# Patient Record
Sex: FEMALE | Race: OTHER
[De-identification: names, ages, dates, MRNs, and addresses within clinical notes are randomized per-mention and may not be internally consistent; named-entity substitution may affect disease eponyms.]

---

## 2019-06-04 ENCOUNTER — HOSPITAL ENCOUNTER (OUTPATIENT)
Dept: HOSPITAL 56 - MW.OBCHECK | Age: 24
Setting detail: OBSERVATION
Discharge: HOME | End: 2019-06-04
Attending: OBSTETRICS & GYNECOLOGY | Admitting: OBSTETRICS & GYNECOLOGY
Payer: SELF-PAY

## 2019-06-04 DIAGNOSIS — Z3A.35: ICD-10-CM

## 2019-06-17 ENCOUNTER — HOSPITAL ENCOUNTER (INPATIENT)
Dept: HOSPITAL 56 - MW.OB | Age: 24
LOS: 2 days | Discharge: HOME | End: 2019-06-19
Attending: OBSTETRICS & GYNECOLOGY | Admitting: OBSTETRICS & GYNECOLOGY
Payer: COMMERCIAL

## 2019-06-17 DIAGNOSIS — Z3A.38: ICD-10-CM

## 2019-06-17 NOTE — PCM.LDHP
L&D History of Present Illness





- General


Date of Service: 06/17/19


Admit Problem/Dx: 


 Patient Status Order with Admit Dx/Problem





06/17/19 14:17


Patient Status [ADT] Routine 








 Admission Diagnosis/Problem











Admission Diagnosis/Problem    Pregnancy














Source of Information: Patient


History Limitations: Reports: No Limitations





- History of Present Illness


Improves with: Reports: None


Worsens with: Reports: None


Associated Symptoms: Reports: N





- Related Data


Allergies/Adverse Reactions: 


 Allergies











Allergy/AdvReac Type Severity Reaction Status Date / Time


 


No Known Allergies Allergy   Verified 06/04/19 16:32











Home Medications: 


 Home Meds





Prenatal #103/Iron Fumarate/Fa [ Prenatal] 1 tab PO DAILY 12/20/18 [

History]











Past Medical History





- Past Health History


Medical/Surgical History: Denies Medical/Surgical History


OB/GYN History: Reports: Pregnancy





- Infectious Disease History


Infectious Disease History: Reports: None





Social & Family History





- Family History


Family Medical History: Noncontributory





- Caffeine Use


Caffeine Use: Reports: Coffee





H&P Review of Systems





- Review of Systems:


Review Of Systems: See Below


General: Reports: No Symptoms


HEENT: Reports: No Symptoms


Pulmonary: Reports: No Symptoms


Cardiovascular: Reports: No Symptoms


Gastrointestinal: Reports: No Symptoms


Genitourinary: Reports: No Symptoms


Musculoskeletal: Reports: No Symptoms


Skin: Reports: No Symptoms


Psychiatric: Reports: No Symptoms


Neurological: Reports: No Symptoms


Hematologic/Lymphatic: Reports: No Symptoms


Immunologic: Reports: No Symptoms





L&D Exam





- Exam


Exam: See Below





- Vital Signs


Weight: 74.389 kg





- OB Specific


Fundal Height In cm: 37


Contraction Intensity: Mild


Fetal Movement: Active


Fetal Heart Tones: Present


Birth Presentation: Vertex





- Owens Score


Owens Score Cervix Position: Midposition


Owens Score Consistency: Soft


Owens Score Effacement: 31-50%


Owens Score Dilation: 1-2 cm


Owens Score Infant's Station: -3


Owens Score Total: 5





- Exam


General: Alert, Oriented


HEENT: PERRLA, Conjunctiva Clear, EACs Clear, EOMI, Hearing Intact, Mucosa 

Moist & Pink, Nares Patent, Normal Nasal Septum, Posterior Pharynx Clear, TMs 

Clear


Neck: Supple, Trachea Midline


Lungs: Clear to Auscultation, Normal Respiratory Effort


Cardiovascular: Regular Rate, Regular Rhythm


GI/Abdominal Exam: Normal Bowel Sounds, Soft, Non-Tender, No Organomegaly, No 

Distention, No Abnormal Bruit, No Mass, Pelvis Stable


Rectal Exam: Normal Exam, Normal Rectal Tone


Genitourinary: Normal external exam, Normal bimanual exam, Normal speculum exam


Back Exam: Normal Inspection, Full Range of Motion


Extremities: Normal Inspection, Normal Range of Motion, Non-Tender, No Pedal 

Edema, Normal Capillary Refill


Skin: Warm, Dry, Intact


Neurological: Cranial Nerves Intact, Reflexes Equal Bilateral


Psychiatric: Alert, Normal Affect, Normal Mood





- Patient Data


Lab Results Last 24 hrs: 


 Laboratory Results - last 24 hr











  06/17/19 06/17/19 Range/Units





  14:45 14:45 


 


WBC  4.85   (4.0-11.0)  K/uL


 


RBC  4.28 L   (4.30-5.90)  M/uL


 


Hgb  12.7   (12.0-16.0)  g/dL


 


Hct  38.6   (36.0-46.0)  %


 


MCV  90.2   (80.0-98.0)  fL


 


MCH  29.7   (27.0-32.0)  pg


 


MCHC  32.9   (31.0-37.0)  g/dL


 


RDW Std Deviation  48.5   (28.0-62.0)  fl


 


RDW Coeff of Leann  15   (11.0-15.0)  %


 


Plt Count  94 L   (150-400)  K/uL


 


MPV  10.60   (7.40-12.00)  fL


 


Nucleated RBC %  0.0   /100WBC


 


Nucleated RBCs #  0   K/uL


 


Blood Type   O POSITIVE  


 


Antibody Screen   NEGATIVE  











Result Diagrams: 


 06/17/19 14:45





Problem List Initiated/Reviewed/Updated: Yes


Orders Last 24hrs: 


 Active Orders 24 hr











 Category Date Time Status


 


 Patient Status [ADT] Routine ADT  06/17/19 14:17 Active


 


 Bedrest Bathroom Privileges [RC] ASDIRECTED Care  06/17/19 14:17 Active


 


 Blood Glucose Check, Bedside [RC] Q4HR Care  06/17/19 15:23 Active


 


 Communication Order [RC] ASDIRECTED Care  06/17/19 14:17 Active


 


 Communication Order [RC] ASDIRECTED Care  06/17/19 14:17 Active


 


 Communication Order [RC] ASDIRECTED Care  06/17/19 14:17 Active


 


 Fetal Heart Tones [RC] CONTINUOUS Care  06/17/19 14:17 Active


 


 May Shower [RC] ASDIRECTED Care  06/17/19 14:17 Active


 


 Notify Provider [RC] PRN Care  06/17/19 14:17 Active


 


 Notify Provider [RC] PRN Care  06/17/19 14:17 Active


 


 Notify Provider [RC] PRN Care  06/17/19 14:17 Active


 


 Notify Provider [RC] STAT Care  06/17/19 14:17 Active


 


 Oxygen Therapy [RC] ASDIRECTED Care  06/17/19 14:17 Active


 


 Vaginal Exam [RC] PRN Care  06/17/19 14:17 Active


 


 Vaginal Exam [RC] PRN Care  06/17/19 14:17 Active


 


 Vital Signs [RC] PER UNIT ROUTINE Care  06/17/19 14:17 Active


 


 Consistent Carbohydrate Diet [DIET] Diet  06/17/19 Dinner Active


 


 Butorphanol [Stadol] Med  06/17/19 14:17 Active





 1 mg IVPUSH Q1H PRN   


 


 Carboprost Tromethamine [Hemabate DS] Med  06/17/19 14:17 Active





 250 mcg IM ASDIRECTED PRN   


 


 Lactated Ringers [Ringers, Lactated] 1,000 ml Med  06/17/19 14:30 Active





 IV ASDIRECTED   


 


 Lidocaine 1% [Xylocaine 1%] Med  06/17/19 14:17 Active





 50 ml INJECT ONETIME PRN   


 


 Methylergonovine [Methergine] Med  06/17/19 14:17 Active





 0.2 mg IM ASDIRECTED PRN   


 


 Nalbuphine [Nubain] Med  06/17/19 14:17 Active





 10 mg IVPUSH Q1H PRN   


 


 Ondansetron [Zofran] Med  06/17/19 14:17 Active





 4 mg IV Q6H PRN   


 


 Oxytocin/0.9 % Sodium Chloride [Oxytocin 30 Unit/500 ML Med  06/17/19 14:30 

Active





 -NS]   





 30 unit in 500 ml IV TITRATE   


 


 Oxytocin/0.9 % Sodium Chloride [Oxytocin 30 Unit/500 ML Med  06/17/19 14:30 

Active





 -NS]   





 30 unit in 500 ml IV TITRATE   


 


 Sodium Chloride 0.9% [Normal Saline] Med  06/17/19 14:17 Active





 10 ml IV ASDIRECTED PRN   


 


 Sodium Chloride 0.9% [Saline Flush] Med  06/17/19 14:17 Active





 10 ml FLUSH ASDIRECTED PRN   


 


 Sodium Chloride 0.9% [Saline Flush] Med  06/17/19 14:17 Active





 2.5 ml FLUSH ASDIRECTED PRN   


 


 Terbutaline [Brethine] Med  06/17/19 14:17 Active





 0.25 mg SUBCUT ASDIRECTED PRN   


 


 Tranexamic Acid [Cyklokapron] 1,000 mg Med  06/17/19 14:17 Active





 Sodium Chloride 0.9% [Normal Saline] 100 ml   





 IV ONETIME   


 


 Water For Irrigation,Sterile [Sterile Water for Med  06/17/19 14:17 Active





 Irrigation]   





 1,000 ml IRR ASDIRECTED PRN   


 


 miSOPROStol [Cytotec] Med  06/17/19 14:17 Active





 200 mcg PO ONETIME PRN   


 


 miSOPROStol [Cytotec] Med  06/17/19 15:00 Active





 25 mcg PO ONETIME PRN   


 


 miSOPROStol [Cytotec] Med  06/17/19 19:00 Active





 25 mcg PO Q4H PRN   


 


 miSOPROStol [Cytotec] Med  06/17/19 15:00 Active





 25 mcg VAG ONETIME PRN   


 


 miSOPROStol [Cytotec] Med  06/17/19 19:00 Active





 25 mcg VAG Q4H PRN   


 


 Fetal Scalp Electrode [WOMSER] Per Unit Routine Oth  06/17/19 14:17 Ordered


 


 Medication Administration Instruction [OM.PC] Q3H Oth  06/17/19 14:30 Ordered


 


 Peripheral IV Insertion Adult [OM.PC] Routine Oth  06/17/19 14:17 Ordered


 


 Resuscitation Status Routine Resus Stat  06/17/19 14:17 Ordered








 Medication Orders





Butorphanol Tartrate (Stadol)  1 mg IVPUSH Q1H PRN


   PRN Reason: Pain


Carboprost Tromethamine (Hemabate Ds)  250 mcg IM ASDIRECTED PRN


   PRN Reason: Post Partum Hemorrhage


Lactated Ringer's (Ringers, Lactated)  1,000 mls @ 150 mls/hr IV ASDIRECTED PATRICIA


Oxytocin/Sodium Chloride (Oxytocin 30 Unit/500 Ml-Ns)  30 unit in 500 mls @ 999 

mls/hr IV TITRATE PATRICIA


Oxytocin/Sodium Chloride (Oxytocin 30 Unit/500 Ml-Ns)  30 unit in 500 mls @ 2 

mls/hr IV TITRATE PATRICIA; Protocol


Tranexamic Acid 1,000 mg/ (Sodium Chloride)  110 mls @ 660 mls/hr IV ONETIME PRN


   PRN Reason: Bleeding


Lidocaine HCl (Xylocaine 1%)  50 ml INJECT ONETIME PRN


   PRN Reason: Laceration repair


Methylergonovine Maleate (Methergine)  0.2 mg IM ASDIRECTED PRN


   PRN Reason: Post Partum Hemorrhage


Misoprostol (Cytotec)  200 mcg PO ONETIME PRN


   PRN Reason: Post Partum Hemorrhage


Misoprostol (Cytotec)  25 mcg VAG ONETIME PRN


   PRN Reason: Cervical Ripening


   Last Admin: 06/17/19 15:59  Dose: 25 mcg


Misoprostol (Cytotec)  25 mcg VAG Q4H PRN


   PRN Reason: Cervical Ripening


Misoprostol (Cytotec)  25 mcg PO ONETIME PRN


   PRN Reason: Cervical Ripening


   Last Admin: 06/17/19 15:53  Dose: 25 mcg


Misoprostol (Cytotec)  25 mcg PO Q4H PRN


   PRN Reason: Cervical Ripening


Nalbuphine HCl (Nubain)  10 mg IVPUSH Q1H PRN


   PRN Reason: Pain (severe 7-10)


Ondansetron HCl (Zofran)  4 mg IV Q6H PRN


   PRN Reason: Nausea/Vomiting


Sodium Chloride (Saline Flush)  10 ml FLUSH ASDIRECTED PRN


   PRN Reason: Keep Vein Open


Sodium Chloride (Saline Flush)  2.5 ml FLUSH ASDIRECTED PRN


   PRN Reason: Keep Vein Open


Sodium Chloride (Normal Saline)  10 ml IV ASDIRECTED PRN


   PRN Reason: IV Use


Sterile Water (Sterile Water For Irrigation)  1,000 ml IRR ASDIRECTED PRN


   PRN Reason: delivery


Terbutaline Sulfate (Brethine)  0.25 mg SUBCUT ASDIRECTED PRN


   PRN Reason: Tacysystole








Assessment/Plan Comment:: 





. diabeticadmoted for elective induction.

## 2019-06-17 NOTE — PCM.PREANE
Preanesthetic Assessment





- Procedure


Proposed Procedure: 





labor epidural





- Anesthesia/Transfusion/Family Hx


Anesthesia History: No Prior Anesthesia


Family History of Anesthesia Reaction: No


Transfusion History: No Prior Transfusion(s)





- Review of Systems


Pulmonary: No Symptoms


Cardiovascular: No Symptoms


Gastrointestinal: No Symptoms


Neurological: No Symptoms


Other: Reports: None





- Physical Assessment


NPO Status Date: 19


NPO Status Time: 16:00


Height: 1.68 m


Weight: 74.389 kg


Mental Status: Alert & Oriented x3


Dentition: Reports: Normal Dentition


ROM/Head Extension: Full





- Lab


Values: 





 Laboratory Last Values











WBC  4.85 K/uL (4.0-11.0)   19  14:45    


 


RBC  4.28 M/uL (4.30-5.90)  L  19  14:45    


 


Hgb  12.7 g/dL (12.0-16.0)   19  14:45    


 


Hct  38.6 % (36.0-46.0)   19  14:45    


 


MCV  90.2 fL (80.0-98.0)   19  14:45    


 


MCH  29.7 pg (27.0-32.0)   19  14:45    


 


MCHC  32.9 g/dL (31.0-37.0)   19  14:45    


 


RDW Std Deviation  48.5 fl (28.0-62.0)   19  14:45    


 


RDW Coeff of Leann  15 % (11.0-15.0)   19  14:45    


 


Plt Count  94 K/uL (150-400)  L  19  14:45    


 


MPV  10.60 fL (7.40-12.00)   19  14:45    


 


Nucleated RBC %  0.0 /100WBC  19  14:45    


 


Nucleated RBCs #  0 K/uL  19  14:45    


 


POC Glucose  114 mg/dL ()  H  19  21:19    


 


Blood Type  O POSITIVE   19  14:45    


 


Antibody Screen  NEGATIVE   19  14:45    














- Allergies


Allergies/Adverse Reactions: 


 Allergies











Allergy/AdvReac Type Severity Reaction Status Date / Time


 


No Known Allergies Allergy   Verified 19 16:32














- Blood


Blood Available: No


Product(s) Available: None





- Anesthesia Plan


Pre-Op Medication Ordered: None





- Acknowledgements


Anesthesia Type Planned: Epidural


Pt an Appropriate Candidate for the Planned Anesthesia: Yes


Alternatives and Risks of Anesthesia Discussed w Pt/Guardian: Yes


Pt/Guardian Understands and Agrees with Anesthesia Plan: Yes





PreAnesthesia Questionnaire


Respiratory History: Reports: None


OB/GYN History: Reports: Pregnancy


: 2


Para: 1


LMP (Approximate): Pregnant


Musculoskeletal History: Reports: None


Neurological History: Reports: None


Psychiatric History: Reports: None


Endocrine/Metabolic History: Reports: Diabetes, Gestational


Hematologic History: Reports: None


Immunologic History: Reports: None


Oncologic (Cancer) History: Reports: None


Dermatologic History: Reports: None





- Past Surgical History


HEENT Surgical History: Reports: None


Cardiovascular Surgical History: Reports: None


Respiratory Surgical History: Reports: None


GI Surgical History: Reports: None


Female  Surgical History: Reports: None


Endocrine Surgical History: Reports: None


Neurological Surgical History: Reports: None


Musculoskeletal Surgical History: Reports: None


Oncologic Surgical History: Reports: None


Dermatological Surgical History: Reports: None





- SUBSTANCE USE


Smoking Status *Q: Never Smoker


Second Hand Smoke Exposure: No


Recreational Drug Use History: No





- HOME MEDS


Home Medications: 


 Home Meds





Prenatal #103/Iron Fumarate/Fa [ Prenatal] 1 tab PO DAILY 18 [

History]











- CURRENT (IN HOUSE) MEDS


Current Meds: 





 Current Medications





Butorphanol Tartrate (Stadol)  1 mg IVPUSH Q1H PRN


   PRN Reason: Pain


Carboprost Tromethamine (Hemabate Ds)  250 mcg IM ASDIRECTED PRN


   PRN Reason: Post Partum Hemorrhage


Lactated Ringer's (Ringers, Lactated)  1,000 mls @ 150 mls/hr IV ASDIRECTED PATRICIA


   Last Admin: 19 21:51 Dose:  150 mls/hr


Oxytocin/Sodium Chloride (Oxytocin 30 Unit/500 Ml-Ns)  30 unit in 500 mls @ 999 

mls/hr IV TITRATE PATRICIA


Oxytocin/Sodium Chloride (Oxytocin 30 Unit/500 Ml-Ns)  30 unit in 500 mls @ 2 

mls/hr IV TITRATE PATRICIA; Protocol


Tranexamic Acid 1,000 mg/ (Sodium Chloride)  110 mls @ 660 mls/hr IV ONETIME PRN


   PRN Reason: Bleeding


Lidocaine HCl (Xylocaine 1%)  50 ml INJECT ONETIME PRN


   PRN Reason: Laceration repair


Methylergonovine Maleate (Methergine)  0.2 mg IM ASDIRECTED PRN


   PRN Reason: Post Partum Hemorrhage


Misoprostol (Cytotec)  200 mcg PO ONETIME PRN


   PRN Reason: Post Partum Hemorrhage


Misoprostol (Cytotec)  25 mcg VAG ONETIME PRN


   PRN Reason: Cervical Ripening


   Last Admin: 19 15:59 Dose:  25 mcg


Misoprostol (Cytotec)  25 mcg VAG Q4H PRN


   PRN Reason: Cervical Ripening


Misoprostol (Cytotec)  25 mcg PO ONETIME PRN


   PRN Reason: Cervical Ripening


   Last Admin: 19 15:53 Dose:  25 mcg


Misoprostol (Cytotec)  25 mcg PO Q4H PRN


   PRN Reason: Cervical Ripening


Nalbuphine HCl (Nubain)  10 mg IVPUSH Q1H PRN


   PRN Reason: Pain (severe 7-10)


Ondansetron HCl (Zofran)  4 mg IV Q6H PRN


   PRN Reason: Nausea/Vomiting


Sodium Chloride (Saline Flush)  10 ml FLUSH ASDIRECTED PRN


   PRN Reason: Keep Vein Open


Sodium Chloride (Saline Flush)  2.5 ml FLUSH ASDIRECTED PRN


   PRN Reason: Keep Vein Open


Sodium Chloride (Normal Saline)  10 ml IV ASDIRECTED PRN


   PRN Reason: IV Use


Sterile Water (Sterile Water For Irrigation)  1,000 ml IRR ASDIRECTED PRN


   PRN Reason: delivery


Terbutaline Sulfate (Brethine)  0.25 mg SUBCUT ASDIRECTED PRN


   PRN Reason: Tacysystole





Discontinued Medications





Fentanyl/Bupivacaine HCl (Fentanyl-Bupiv-Ns 2 Mcg/Ml-0.125%) Confirm 

Administered Dose 100 mls @ as directed .ROUTE .STK-MED ONE


   Stop: 19 20:36

## 2019-06-18 PROCEDURE — 3E0P7VZ INTRODUCTION OF HORMONE INTO FEMALE REPRODUCTIVE, VIA NATURAL OR ARTIFICIAL OPENING: ICD-10-PCS | Performed by: OBSTETRICS & GYNECOLOGY

## 2019-06-18 NOTE — PCM.DEL
L & D Note





- General Info


Date of Service: 19


Mother's Due Date: 19





- Delivery Note


Labor: Augmented by Oxytocin


Cervical Ripening Method: Misoprostil


Delivery Outcome: Livebirth


Infant Delivery Method: Spontaneous Vaginal Delivery-Single


Infant Delivery Mode: Spontaneous


Birth Presentation: Vertex


Nuchal Cord: None


Anesthesia Type: Epidural


Amniotic Fluid Description: Clear


Episiotomy Type: None


Laceration: None


Placenta: Intact, Spontaneous


Cord: 3 Vessels


Estimated Blood Loss: 150


Resuscitation Needed: No


APGAR Score 1 min: 8


APGAR Score 5 min: 9


Second Stage Interventions: Reports: Pushing, Pulls Own Legs Back


Delivery Comments (Free Text/Narrative):: 





 of viable female, Head delivered with good pushing, shoulders and body 

followed with good pushing. Tight abdomen. Infant with spont cry placed on 

mothers abd with RN at .  Delayed cord clamping. Pitocin to IVF. Cord clamped 

and cut by FOB. Cord blood collected. Placenta delivered grossly intact 3VC. 

Inspection noted intact perineum. Mother and baby left in stable condition.





Induction Criteria





- Owens Score


Owens Score Dilation: 1-2 cm


Owens Score Effacement: 60-70%


Owens Score Infant's Station: -2


Owens Score Consistency: Soft


Owens Score Cervix Position: Midposition


Owens Score Total: 7


Owens Score Presenting Part: Reports: Cephalic





- Induction


Gestational Age >/= 39 wks: No


Medical Indication: 





GDMA2


Reassuring Fetal Monitoring Strip: Yes


Absence of Tachy Systole: Yes





- General Info


Date of Service: 19


Admission Dx/Problem (Free Text): 


 Patient Status Order with Admit Dx/Problem





19 14:17


Patient Status [ADT] Routine 








 Admission Diagnosis/Problem











Admission Diagnosis/Problem    Pregnancy














Functional Status: Reports: Pain Controlled





- Review of Systems


General: Reports: No Symptoms


HEENT: Reports: No Symptoms


Pulmonary: Reports: No Symptoms


Cardiovascular: Reports: No Symptoms


Gastrointestinal: Reports: No Symptoms


Genitourinary: Reports: No Symptoms


Musculoskeletal: Reports: No Symptoms


Skin: Reports: No Symptoms


Neurological: Reports: No Symptoms


Psychiatric: Reports: No Symptoms





- Patient Data


Weight - Most Recent: 74.389 kg


Lab Results Last 24 Hours: 


 Laboratory Results - last 24 hr











  19 Range/Units





  14:45 14:45 16:27 


 


WBC  4.85    (4.0-11.0)  K/uL


 


RBC  4.28 L    (4.30-5.90)  M/uL


 


Hgb  12.7    (12.0-16.0)  g/dL


 


Hct  38.6    (36.0-46.0)  %


 


MCV  90.2    (80.0-98.0)  fL


 


MCH  29.7    (27.0-32.0)  pg


 


MCHC  32.9    (31.0-37.0)  g/dL


 


RDW Std Deviation  48.5    (28.0-62.0)  fl


 


RDW Coeff of Leann  15    (11.0-15.0)  %


 


Plt Count  94 L    (150-400)  K/uL


 


MPV  10.60    (7.40-12.00)  fL


 


Nucleated RBC %  0.0    /100WBC


 


Nucleated RBCs #  0    K/uL


 


POC Glucose    82  ()  mg/dL


 


Blood Type   O POSITIVE   


 


Antibody Screen   NEGATIVE   














  19 Range/Units





  20:07 21:19 23:38 


 


WBC     (4.0-11.0)  K/uL


 


RBC     (4.30-5.90)  M/uL


 


Hgb     (12.0-16.0)  g/dL


 


Hct     (36.0-46.0)  %


 


MCV     (80.0-98.0)  fL


 


MCH     (27.0-32.0)  pg


 


MCHC     (31.0-37.0)  g/dL


 


RDW Std Deviation     (28.0-62.0)  fl


 


RDW Coeff of Leann     (11.0-15.0)  %


 


Plt Count     (150-400)  K/uL


 


MPV     (7.40-12.00)  fL


 


Nucleated RBC %     /100WBC


 


Nucleated RBCs #     K/uL


 


POC Glucose  190 H  114 H  71  ()  mg/dL


 


Blood Type     


 


Antibody Screen     














  19 Range/Units





  00:42 


 


WBC   (4.0-11.0)  K/uL


 


RBC   (4.30-5.90)  M/uL


 


Hgb   (12.0-16.0)  g/dL


 


Hct   (36.0-46.0)  %


 


MCV   (80.0-98.0)  fL


 


MCH   (27.0-32.0)  pg


 


MCHC   (31.0-37.0)  g/dL


 


RDW Std Deviation   (28.0-62.0)  fl


 


RDW Coeff of Leann   (11.0-15.0)  %


 


Plt Count   (150-400)  K/uL


 


MPV   (7.40-12.00)  fL


 


Nucleated RBC %   /100WBC


 


Nucleated RBCs #   K/uL


 


POC Glucose  114 H  ()  mg/dL


 


Blood Type   


 


Antibody Screen   











Med Orders - Current: 


 Current Medications





Butorphanol Tartrate (Stadol)  1 mg IVPUSH Q1H PRN


   PRN Reason: Pain


Carboprost Tromethamine (Hemabate Ds)  250 mcg IM ASDIRECTED PRN


   PRN Reason: Post Partum Hemorrhage


Lactated Ringer's (Ringers, Lactated)  1,000 mls @ 150 mls/hr IV ASDIRECTED PATRICIA


   Last Admin: 19 21:51 Dose:  150 mls/hr


Oxytocin/Sodium Chloride (Oxytocin 30 Unit/500 Ml-Ns)  30 unit in 500 mls @ 999 

mls/hr IV TITRATE PATRICIA


Oxytocin/Sodium Chloride (Oxytocin 30 Unit/500 Ml-Ns)  30 unit in 500 mls @ 2 

mls/hr IV TITRATE PATRICIA; Protocol


   Last Titration: 19 02:30 Dose:  6 munits/min, 6 mls/hr


Tranexamic Acid 1,000 mg/ (Sodium Chloride)  110 mls @ 660 mls/hr IV ONETIME PRN


   PRN Reason: Bleeding


Lidocaine HCl (Xylocaine 1%)  50 ml INJECT ONETIME PRN


   PRN Reason: Laceration repair


Methylergonovine Maleate (Methergine)  0.2 mg IM ASDIRECTED PRN


   PRN Reason: Post Partum Hemorrhage


Misoprostol (Cytotec)  200 mcg PO ONETIME PRN


   PRN Reason: Post Partum Hemorrhage


Misoprostol (Cytotec)  25 mcg VAG ONETIME PRN


   PRN Reason: Cervical Ripening


   Last Admin: 19 15:59 Dose:  25 mcg


Misoprostol (Cytotec)  25 mcg VAG Q4H PRN


   PRN Reason: Cervical Ripening


Misoprostol (Cytotec)  25 mcg PO ONETIME PRN


   PRN Reason: Cervical Ripening


   Last Admin: 19 15:53 Dose:  25 mcg


Misoprostol (Cytotec)  25 mcg PO Q4H PRN


   PRN Reason: Cervical Ripening


Nalbuphine HCl (Nubain)  10 mg IVPUSH Q1H PRN


   PRN Reason: Pain (severe 7-10)


Ondansetron HCl (Zofran)  4 mg IV Q6H PRN


   PRN Reason: Nausea/Vomiting


   Last Admin: 19 01:00 Dose:  4 mg


Sodium Chloride (Saline Flush)  10 ml FLUSH ASDIRECTED PRN


   PRN Reason: Keep Vein Open


Sodium Chloride (Saline Flush)  2.5 ml FLUSH ASDIRECTED PRN


   PRN Reason: Keep Vein Open


Sodium Chloride (Normal Saline)  10 ml IV ASDIRECTED PRN


   PRN Reason: IV Use


Sterile Water (Sterile Water For Irrigation)  1,000 ml IRR ASDIRECTED PRN


   PRN Reason: delivery


Terbutaline Sulfate (Brethine)  0.25 mg SUBCUT ASDIRECTED PRN


   PRN Reason: Tacysystole





Discontinued Medications





Fentanyl/Bupivacaine HCl (Fentanyl-Bupiv-Ns 2 Mcg/Ml-0.125%) Confirm 

Administered Dose 100 mls @ as directed .ROUTE .STK-MED ONE


   Stop: 19 20:36


Promethazine HCl (Phenergan)  12.5 mg IM ONETIME ONE


   Stop: 19 01:14


   Last Admin: 19 01:48 Dose:  12.5 mg











- Exam


General: Alert, Oriented, Cooperative, No Acute Distress


Lungs: Normal Respiratory Effort


 (Female) Exam: Normal External Exam, Normal Bimanual Exam, Vaginal Bleeding.

  No: Vaginal Lesions, Vaginal Tears


Back Exam: Normal Inspection, Full Range of Motion


Extremities: Normal Inspection, Normal Range of Motion, Non-Tender, No Pedal 

Edema


Skin: Warm, Dry, Intact


Neurological: No New Focal Deficit, Normal Speech, Normal Tone, Strength Equal 

Bilateral


Psy/Mental Status: Alert, Normal Affect, Normal Mood





- Problem List & Annotations


(1) Supervision of normal IUP (intrauterine pregnancy) in multigravida


SNOMED Code(s): 599945696, 808521716, 277963668


   Code(s): Z34.80 - ENCOUNTER FOR SUPRVSN OF NORMAL PREGNANCY, UNSP TRIMESTER 

  Status: Acute   Priority: High   Current Visit: Yes   


Qualifiers: 


   Trimester: third trimester   Qualified Code(s): Z34.83 - Encounter for 

supervision of other normal pregnancy, third trimester   





(2) GDM, class A2


SNOMED Code(s): 14124494


   Code(s): O24.419 - GESTATIONAL DIABETES MELLITUS IN PREGNANCY, UNSP CONTROL 

  Status: Acute   Priority: High   Current Visit: Yes   





(3)  (normal spontaneous vaginal delivery)


SNOMED Code(s): 32021577, 906552755


   Code(s): O80 - ENCOUNTER FOR FULL-TERM UNCOMPLICATED DELIVERY   Status: 

Acute   Priority: High   Current Visit: Yes   





- Problem List Review


Problem List Initiated/Reviewed/Updated: Yes





- My Orders


Last 24 Hours: 


My Active Orders





19 17:42


Communication Order [RC] ASDIRECTED 














- Plan


Plan:: 





. diabeticadmoted for elective induction. 





Delivery


A:  viable female, APGARS 8/9, Wt: 8lb 11oz. Intact perineum, EBL 150cc. 

Mother and baby stable


P: Routine PP plan of care

## 2019-06-19 NOTE — [id number]
**Discharge Summary





- Hospital Course


Free Text/Narrative:: 





Discharge home with infnt. Follow up in 6 weeks for postpartum.


Diagnosis: Stroke: No





- Discharge Data


Discharge Date: 19


Discharge Disposition: Home, Self-Care 01


Condition: Good





- Discharge Diagnosis/Problem(s)


(1) Supervision of normal IUP (intrauterine pregnancy) in multigravida


SNOMED Code(s): 523819091, 922464950, 493470897


   ICD Code: Z34.80 - ENCOUNTER FOR SUPRVSN OF NORMAL PREGNANCY, UNSP TRIMESTER

   Status: Acute   Priority: High   Current Visit: Yes   


Qualifiers: 


   Trimester: third trimester   Qualified Code(s): Z34.83 - Encounter for 

supervision of other normal pregnancy, third trimester   





(2) GDM, class A2


SNOMED Code(s): 83418175


   ICD Code: O24.419 - GESTATIONAL DIABETES MELLITUS IN PREGNANCY, UNSP CONTROL

   Status: Acute   Priority: High   Current Visit: Yes   





(3)  (normal spontaneous vaginal delivery)


SNOMED Code(s): 48783827, 378215644


   ICD Code: O80 - ENCOUNTER FOR FULL-TERM UNCOMPLICATED DELIVERY   Status: 

Acute   Priority: High   Current Visit: Yes   





- Patient Instructions


Diet: Usual Diet as Tolerated


Activity: As Tolerated, No Strenuous Activities, Rest and Relax Today


Driving: May Drive Today


Showering/Bathing: May Shower


Notify Provider of: Fever, Increased Pain, Swelling and Redness, Nausea and/or 

Vomiting


Other/Special Instructions: Discharge home with infnt. Follow up in 6 weeks for 

postpartum.





- Discharge Plan


*PRESCRIPTION DRUG MONITORING PROGRAM REVIEWED*: Not Applicable


*COPY OF PRESCRIPTION DRUG MONITORING REPORT IN PATIENT TOBIAS: Not Applicable


Prescriptions/Med Rec: 


Ibuprofen [Motrin] 800 mg PO Q6H PRN #90 tablet


 PRN Reason: Pain


Home Medications: 


 Home Meds





Prenatal #103/Iron Fumarate/Fa [ Prenatal] 1 tab PO DAILY 18 [

History]


Ibuprofen [Motrin] 800 mg PO Q6H PRN #90 tablet 19 [Rx]








Oxygen Therapy Mode: Room Air


Referrals: 


[facility] [Outside]


Obdulia Jessica CNM [Mid-Wife] - 19 10:45 am





- Discharge Summary/Plan Comment


DC Time >30 min.: Yes





- General Info


Date of Service: 19


Admission Dx/Problem (Free Text: 


 Patient Status Order with Admit Dx/Problem





19 14:17


Patient Status [ADT] Routine 








 Admission Diagnosis/Problem











Admission Diagnosis/Problem    Pregnancy














Functional Status: Reports: Pain Controlled, Tolerating Diet, Ambulating, 

Urinating





- Review of Systems


General: Reports: No Symptoms


HEENT: Reports: No Symptoms


Pulmonary: Reports: No Symptoms


Cardiovascular: Reports: No Symptoms


Gastrointestinal: Reports: No Symptoms


Genitourinary: Reports: No Symptoms


Musculoskeletal: Reports: No Symptoms


Skin: Reports: No Symptoms


Neurological: Reports: No Symptoms


Psychiatric: Reports: No Symptoms





- Patient Data


Vitals - Most Recent: 


 Last Vital Signs











Temp  36.6 C   19 07:10


 


Pulse  85   19 07:10


 


Resp  17   19 07:10


 


BP  137/81   19 07:10


 


Pulse Ox  97   19 07:10











Weight - Most Recent: 74.389 kg


Med Orders - Current: 


 Current Medications





Acetaminophen (Tylenol Extra Strength)  500 mg PO Q4H PRN


   PRN Reason: Pain


Acetaminophen (Tylenol Extra Strength)  1,000 mg PO Q4H PRN


   PRN Reason: Pain


   Last Admin: 19 05:05 Dose:  1,000 mg


Benzocaine/Menthol (Dermoplast Pain Relief 20%-0.5% Spray)  78 gm TOP 

ASDIRECTED PRN


   PRN Reason: Perineal Comfort Measure


Bisacodyl (Dulcolax)  10 mg RECTAL ONETIME PRN


   PRN Reason: Constipation


Docusate Sodium (Colace)  100 mg PO BID PRN


   PRN Reason: Constipation


Emollient Ointment (Lansinoh Hpa)  0 gm TOP ASDIRECTED PRN


   PRN Reason: Sore Nipples


   Last Admin: 19 06:54 Dose:  1 applic


Ibuprofen (Motrin)  400 mg PO Q4H PRN


   PRN Reason: Pain


Ibuprofen (Motrin)  800 mg PO Q6H PRN


   PRN Reason: Pain


   Last Admin: 19 05:05 Dose:  800 mg


Oxycodone HCl (Oxycodone)  5 mg PO Q2H PRN


   PRN Reason: Pain


   Last Admin: 19 13:20 Dose:  5 mg


Witch Hazel (Tucks)  1 pad TOP ASDIRECTED PRN


   PRN Reason: comfort care





Discontinued Medications





Butorphanol Tartrate (Stadol)  1 mg IVPUSH Q1H PRN


   PRN Reason: Pain


Carboprost Tromethamine (Hemabate Ds)  250 mcg IM ASDIRECTED PRN


   PRN Reason: Post Partum Hemorrhage


Lactated Ringer's (Ringers, Lactated)  1,000 mls @ 150 mls/hr IV ASDIRECTED PATRICIA


   Last Admin: 19 21:51 Dose:  150 mls/hr


Oxytocin/Sodium Chloride (Oxytocin 30 Unit/500 Ml-Ns)  30 unit in 500 mls @ 999 

mls/hr IV TITRATE PATRICIA


   Last Admin: 19 06:14 Dose:  999 mls/hr


Oxytocin/Sodium Chloride (Oxytocin 30 Unit/500 Ml-Ns)  30 unit in 500 mls @ 2 

mls/hr IV TITRATE PATRICIA; Protocol


   Last Titration: 19 02:30 Dose:  6 munits/min, 6 mls/hr


Tranexamic Acid 1,000 mg/ (Sodium Chloride)  110 mls @ 660 mls/hr IV ONETIME PRN


   PRN Reason: Bleeding


Fentanyl/Bupivacaine HCl (Fentanyl-Bupiv-Ns 2 Mcg/Ml-0.125%) Confirm 

Administered Dose 100 mls @ as directed .ROUTE .STK-MED ONE


   Stop: 19 20:36


Oxytocin/Sodium Chloride (Oxytocin 30 Unit/500 Ml-Ns) Confirm Administered Dose 

30 unit in 500 mls @ as directed .ROUTE .STK-MED ONE


   Stop: 19 06:10


Lidocaine HCl (Xylocaine 1%)  50 ml INJECT ONETIME PRN


   PRN Reason: Laceration repair


Methylergonovine Maleate (Methergine)  0.2 mg IM ASDIRECTED PRN


   PRN Reason: Post Partum Hemorrhage


Misoprostol (Cytotec)  200 mcg PO ONETIME PRN


   PRN Reason: Post Partum Hemorrhage


Misoprostol (Cytotec)  25 mcg VAG ONETIME PRN


   PRN Reason: Cervical Ripening


   Last Admin: 19 15:59 Dose:  25 mcg


Misoprostol (Cytotec)  25 mcg VAG Q4H PRN


   PRN Reason: Cervical Ripening


Misoprostol (Cytotec)  25 mcg PO ONETIME PRN


   PRN Reason: Cervical Ripening


   Last Admin: 19 15:53 Dose:  25 mcg


Misoprostol (Cytotec)  25 mcg PO Q4H PRN


   PRN Reason: Cervical Ripening


Nalbuphine HCl (Nubain)  10 mg IVPUSH Q1H PRN


   PRN Reason: Pain (severe 7-10)


Ondansetron HCl (Zofran)  4 mg IV Q6H PRN


   PRN Reason: Nausea/Vomiting


   Last Admin: 19 01:00 Dose:  4 mg


Promethazine HCl (Phenergan)  12.5 mg IM ONETIME ONE


   Stop: 19 01:14


   Last Admin: 19 01:48 Dose:  12.5 mg


Sodium Chloride (Saline Flush)  10 ml FLUSH ASDIRECTED PRN


   PRN Reason: Keep Vein Open


Sodium Chloride (Saline Flush)  2.5 ml FLUSH ASDIRECTED PRN


   PRN Reason: Keep Vein Open


Sodium Chloride (Normal Saline)  10 ml IV ASDIRECTED PRN


   PRN Reason: IV Use


Sterile Water (Sterile Water For Irrigation)  1,000 ml IRR ASDIRECTED PRN


   PRN Reason: delivery


Terbutaline Sulfate (Brethine)  0.25 mg SUBCUT ASDIRECTED PRN


   PRN Reason: Tacysystole











- Exam


General: Reports: Alert, Oriented, Cooperative, No Acute Distress


Lungs: Reports: Normal Respiratory Effort


GI/Abdominal Exam: Soft, Non-Tender


 (Female) Exam: Deferred, Vaginal Bleeding


Rectal (Female) Exam: Deferred


Back Exam: Reports: Full Range of Motion


Extremities: Normal Inspection, Normal Range of Motion, Non-Tender


Skin: Reports: Warm, Dry, Intact


Neurological: Reports: No New Focal Deficit, Normal Speech, Normal Tone, 

Strength Equal Bilateral


Psy/Mental Status: Reports: Alert, Normal Affect, Normal Mood